# Patient Record
Sex: FEMALE | Race: WHITE
[De-identification: names, ages, dates, MRNs, and addresses within clinical notes are randomized per-mention and may not be internally consistent; named-entity substitution may affect disease eponyms.]

---

## 2017-07-08 ENCOUNTER — HEALTH MAINTENANCE LETTER (OUTPATIENT)
Age: 42
End: 2017-07-08

## 2018-06-19 ENCOUNTER — OFFICE VISIT - HEALTHEAST (OUTPATIENT)
Dept: FAMILY MEDICINE | Facility: CLINIC | Age: 43
End: 2018-06-19

## 2018-06-19 DIAGNOSIS — J06.9 URI (UPPER RESPIRATORY INFECTION): ICD-10-CM

## 2018-06-19 DIAGNOSIS — F17.200 CURRENT SMOKER: ICD-10-CM

## 2018-06-19 RX ORDER — ALBUTEROL SULFATE 90 UG/1
2 AEROSOL, METERED RESPIRATORY (INHALATION) EVERY 6 HOURS PRN
Qty: 1 EACH | Refills: 0 | Status: SHIPPED | OUTPATIENT
Start: 2018-06-19

## 2018-06-19 RX ORDER — MEGESTROL ACETATE 20 MG/1
20 TABLET ORAL DAILY
Refills: 3 | Status: SHIPPED | COMMUNITY
Start: 2018-06-09

## 2018-06-19 RX ORDER — IPRATROPIUM BROMIDE AND ALBUTEROL 20; 100 UG/1; UG/1
SPRAY, METERED RESPIRATORY (INHALATION)
Refills: 0 | Status: SHIPPED | COMMUNITY
Start: 2018-06-11

## 2021-05-30 ENCOUNTER — RECORDS - HEALTHEAST (OUTPATIENT)
Dept: ADMINISTRATIVE | Facility: CLINIC | Age: 46
End: 2021-05-30

## 2021-07-02 PROBLEM — Z12.4 SCREENING FOR CERVICAL CANCER: Status: ACTIVE | Noted: 2017-02-08

## 2021-07-15 VITALS — WEIGHT: 241.1 LBS

## 2021-07-15 NOTE — PROGRESS NOTES
Assessment/Plan:     1. URI (upper respiratory infection)  Sinusitis versus atypical URI?  Given duration of the symptoms and smoking status, will trial patient on an antibiotic.  Doxycycline twice daily ×10 days.  Prescribed albuterol to utilize every 4-6 hours as needed for cough, wheezing, or shortness of breath.  She will continue the Combivent 4 times daily until symptoms resolve.  Highly encouraged complete smoking cessation.  She may continue the Sudafed if helpful.  Offered a cough suppressant, but she declined at this time.  I encourage patient to follow-up with myself or PCP if symptoms worsening or fail to improve as expected.  - albuterol (PROAIR HFA;PROVENTIL HFA;VENTOLIN HFA) 90 mcg/actuation inhaler; Inhale 2 puffs every 6 (six) hours as needed for wheezing.  Dispense: 1 each; Refill: 0  - doxycycline (VIBRA-TABS) 100 MG tablet; Take 1 tablet (100 mg total) by mouth 2 (two) times a day for 10 days.  Dispense: 20 tablet; Refill: 0    2. Current smoker      Subjective:     Beatriz Ferreira is a 42 y.o. female who presents with a cough and sinus congestion for 6 weeks.  Associated symptoms include ear fullness and sore throat with the cough.  Cough is producing moderate amounts of green sputum.  She is producing similar drainage from her nose.  Patient complains of shortness of breath when she lays flat as well as wheezing.  No fevers or chest pain.  Patient is a current smoker.  She has smoked about one half pack per day for the past 20 years.  Patient was seen at the [facility] on 6/11 and diagnosed with a viral URI.  Patient had a negative chest x-ray at that encounter.  She was prescribed a 5 day course of prednisone and Combivent inhaler.  Patient states that the wheezing has gotten slightly better, but she continues to cough both day and night.  She has been snoring for the past 6 weeks, which is very abnormal for her.  Patient has not had a cigarette for the last week.  She is utilizing the  Combivent 1-2 times per day.  She is also recently started some Sudafed, which has helped slightly with the sinus congestion.      The following portions of the patient's history were reviewed and updated as appropriate: allergies, current medications, past family history, past medical history, past social history, past surgical history and problem list.    Review of Systems  A comprehensive review of systems was performed and was otherwise negative    Objective:     /84 (Patient Site: Right Arm, Patient Position: Sitting, Cuff Size: Adult Regular)  Pulse 84  Temp 97.7  F (36.5  C) (Oral)   Wt (!) 241 lb 1.6 oz (109.4 kg)  SpO2 99%    General Appearance: Alert, cooperative, no distress, appears stated age  Ears: Normal TM's and external ear canals, both ears  Nose: Nares normal, septum midline,mucosa normal, purulent drainage  Throat: Lips, mucosa, and tongue normal; teeth and gums normal. Normal pharyx  Neck: Supple, symmetrical, trachea midline, no adenopathy  Lungs: Clear to auscultation bilaterally, respirations unlabored. No rhonchi or wheezing.  Heart: Regular rate and rhythm, S1 and S2 normal, no murmur, rub, or gallop    Joyce Alexander NP-C